# Patient Record
Sex: FEMALE | Race: WHITE | NOT HISPANIC OR LATINO | Employment: FULL TIME | URBAN - METROPOLITAN AREA
[De-identification: names, ages, dates, MRNs, and addresses within clinical notes are randomized per-mention and may not be internally consistent; named-entity substitution may affect disease eponyms.]

---

## 2024-06-13 ENCOUNTER — HOSPITAL ENCOUNTER (EMERGENCY)
Facility: HOSPITAL | Age: 44
Discharge: HOME | End: 2024-06-13
Attending: EMERGENCY MEDICINE
Payer: COMMERCIAL

## 2024-06-13 ENCOUNTER — APPOINTMENT (EMERGENCY)
Dept: RADIOLOGY | Facility: HOSPITAL | Age: 44
End: 2024-06-13
Payer: COMMERCIAL

## 2024-06-13 VITALS
OXYGEN SATURATION: 100 % | HEIGHT: 62 IN | DIASTOLIC BLOOD PRESSURE: 75 MMHG | BODY MASS INDEX: 23.55 KG/M2 | WEIGHT: 128 LBS | HEART RATE: 85 BPM | RESPIRATION RATE: 16 BRPM | SYSTOLIC BLOOD PRESSURE: 138 MMHG | TEMPERATURE: 98.4 F

## 2024-06-13 DIAGNOSIS — S92.502A CLOSED FRACTURE OF PHALANX OF LEFT SECOND TOE, INITIAL ENCOUNTER: ICD-10-CM

## 2024-06-13 DIAGNOSIS — S93.105A CLOSED DISLOCATION OF SECOND TOE OF LEFT FOOT, INITIAL ENCOUNTER: Primary | ICD-10-CM

## 2024-06-13 PROCEDURE — 99284 EMERGENCY DEPT VISIT MOD MDM: CPT | Performed by: PHYSICIAN ASSISTANT

## 2024-06-13 PROCEDURE — 73660 X-RAY EXAM OF TOE(S): CPT

## 2024-06-13 PROCEDURE — 99283 EMERGENCY DEPT VISIT LOW MDM: CPT

## 2024-06-13 NOTE — Clinical Note
Gema Hernandez was seen and treated in our emergency department on 6/13/2024.                Diagnosis: Right toe fracture    Gema  .    She may return on this date: 06/19/2024    Must wear her cast shoe until cleared by podiatry orthopedics     If you have any questions or concerns, please don't hesitate to call.      Boo Grady PA-C    ______________________________           _______________          _______________  Hospital Representative                              Date                                Time

## 2024-06-13 NOTE — DISCHARGE INSTRUCTIONS
Over-the-counter Motrin for pain    Follow-up with the podiatrist orthopedic doctor when you return home    Return here with any worsening symptoms questions comments or concerns

## 2024-06-13 NOTE — ED PROVIDER NOTES
History  Chief Complaint   Patient presents with    Toe Injury     Possible left 2nd toe dislocation, inj in pool      This is a 44-year-old female patient who was at [facility] states she was playing in the pool and believes she dislocated her left second digit.  She is taking nothing for the pain there is no numbness or paresthesias.  He is she declined anything for pain in this emergency department.  She asked them to relocate it and they made her go to the hospital.  At this time she will have an x-ray of her left second digit to make sure it is dislocation versus a fracture if needed will be relocated.        None       No past medical history on file.    No past surgical history on file.    No family history on file.  I have reviewed and agree with the history as documented.    No existing history information found.  No existing history information found.       Review of Systems   Constitutional:  Negative for diaphoresis, fatigue and fever.   HENT:  Negative for congestion, ear pain, nosebleeds and sore throat.    Eyes:  Negative for photophobia, pain, discharge and visual disturbance.   Respiratory:  Negative for cough, choking, chest tightness, shortness of breath and wheezing.    Cardiovascular:  Negative for chest pain and palpitations.   Gastrointestinal:  Negative for abdominal distention, abdominal pain, diarrhea and vomiting.   Genitourinary:  Negative for dysuria, flank pain and frequency.   Musculoskeletal:  Positive for gait problem (left second digit toe pain). Negative for back pain and joint swelling.   Skin:  Negative for color change and rash.   Neurological:  Negative for dizziness, syncope and headaches.   Psychiatric/Behavioral:  Negative for behavioral problems and confusion. The patient is not nervous/anxious.    All other systems reviewed and are negative.      Physical Exam  Physical Exam  Vitals and nursing note reviewed.   Constitutional:       General: She is not in acute  distress.     Appearance: Normal appearance. She is well-developed. She is not ill-appearing, toxic-appearing or diaphoretic.   HENT:      Head: Normocephalic and atraumatic.   Eyes:      Conjunctiva/sclera: Conjunctivae normal.   Cardiovascular:      Rate and Rhythm: Normal rate and regular rhythm.   Pulmonary:      Effort: Pulmonary effort is normal. No respiratory distress.   Abdominal:      Palpations: Abdomen is soft.      Tenderness: There is no abdominal tenderness.   Musculoskeletal:      Cervical back: Neck supple.        Feet:    Skin:     General: Skin is warm and dry.      Capillary Refill: Capillary refill takes less than 2 seconds.   Neurological:      Mental Status: She is alert.   Psychiatric:         Mood and Affect: Mood normal.         Vital Signs  ED Triage Vitals [06/13/24 1422]   Temperature Pulse Respirations Blood Pressure SpO2   98.4 °F (36.9 °C) 85 16 138/75 100 %      Temp Source Heart Rate Source Patient Position - Orthostatic VS BP Location FiO2 (%)   Temporal Monitor Sitting Left arm --      Pain Score       6           Vitals:    06/13/24 1422   BP: 138/75   Pulse: 85   Patient Position - Orthostatic VS: Sitting         Visual Acuity      ED Medications  Medications - No data to display    Diagnostic Studies  Results Reviewed       None                   XR toe second min 2 views LEFT   ED Interpretation by Boo Grady PA-C (06/13 1520)   Adequate reduction however there is some postreduction avulsion noted      XR toe second min 2 views LEFT   ED Interpretation by Boo Grady PA-C (06/13 1502)   Dislocation of the second PIP joint without fracture                 Procedures  Orthopedic injury treatment    Date/Time: 6/13/2024 2:58 PM    Performed by: Boo Grady PA-C  Authorized by: Boo Grady PA-C  Universal Protocol:  Consent: Verbal consent obtained.  Risks and benefits: risks, benefits and alternatives were discussed  Consent given by:  "patient  Time out: Immediately prior to procedure a \"time out\" was called to verify the correct patient, procedure, equipment, support staff and site/side marked as required.  Timeout called at: 6/13/2024 2:59 PM.  Patient understanding: patient states understanding of the procedure being performed  Patient identity confirmed: verbally with patient    Injury location: Left second digit foot.  Distal perfusion: normal    Neurological function: diminished    Range of motion: normal    Local anesthesia used?: No    General anesthesia used?: No    Immobilization: Jane tape.  Neurovascular status: Neurovascularly intact    Distal perfusion: normal    Neurological function: normal    Range of motion: normal    Patient tolerance:  Patient tolerated the procedure well with no immediate complications           ED Course                               SBIRT 20yo+      Flowsheet Row Most Recent Value   Initial Alcohol Screen: US AUDIT-C     1. How often do you have a drink containing alcohol? 0 Filed at: 06/13/2024 1423   2. How many drinks containing alcohol do you have on a typical day you are drinking?  0 Filed at: 06/13/2024 1423   3a. Male UNDER 65: How often do you have five or more drinks on one occasion? 0 Filed at: 06/13/2024 1423   3b. FEMALE Any Age, or MALE 65+: How often do you have 4 or more drinks on one occassion? 0 Filed at: 06/13/2024 1423   Audit-C Score 0 Filed at: 06/13/2024 1423   JANA: How many times in the past year have you...    Used an illegal drug or used a prescription medication for non-medical reasons? Never Filed at: 06/13/2024 1423                      Medical Decision Making  44-year-old female patient came in with an obvious dislocation of her left second digit.  It was relocated while in department there was a subsequent small avulsion fracture.  She is not from this area.  When she returns home she will follow-up with podiatry/orthopedics.  She is placed in a jane tape cast shoe she " declined any pain medication she will follow-up when she gets home return or worsening symptoms    Amount and/or Complexity of Data Reviewed  Radiology: ordered and independent interpretation performed.     Details: I personally interpreted the pre and post films prefilms show dislocation that was relocated small avulsion.  Neurovascular intact before and after  Discussion of management or test interpretation with external provider(s): Joint decision-making occurred with the patient and her  discharge home and follow-up when she gets home.  Return worsening symptoms questions comments or concerns.             Disposition  Final diagnoses:   Closed dislocation of second toe of left foot, initial encounter   Closed fracture of phalanx of left second toe, initial encounter     Time reflects when diagnosis was documented in both MDM as applicable and the Disposition within this note       Time User Action Codes Description Comment    6/13/2024  3:22 PM Boo Grady Add [S93.105A] Closed dislocation of second toe of left foot, initial encounter     6/13/2024  3:23 PM Boo Grady Add [S92.502A] Closed fracture of phalanx of left second toe, initial encounter           ED Disposition       ED Disposition   Discharge    Condition   Stable    Date/Time   Thu Jun 13, 2024 1522    Comment   Gema Hernandez discharge to home/self care.                   Follow-up Information    None         Patient's Medications    No medications on file       No discharge procedures on file.    PDMP Review       None            ED Provider  Electronically Signed by             Boo Grady PA-C  06/13/24 1526       Boo Grady PA-C  06/13/24 1533